# Patient Record
(demographics unavailable — no encounter records)

---

## 2025-05-15 NOTE — CONSULT LETTER
[Dear  ___] : Dear  [unfilled], [Consult Letter:] : I had the pleasure of evaluating your patient, [unfilled]. [Please see my note below.] : Please see my note below. [Consult Closing:] : Thank you very much for allowing me to participate in the care of this patient.  If you have any questions, please do not hesitate to contact me. [Sincerely,] : Sincerely, [FreeTextEntry3] : Blair Staton MD

## 2025-05-15 NOTE — ASSESSMENT
[FreeTextEntry1] : My impression is that of a gentleman with iron deficiency anemia and a history of Workman's struggle with weight control.  I have asked the patient to schedule both an upper endoscopy and a colonoscopy in the near future. I have reviewed the risks benefits and alternatives and provide the patient literature to read.  I have emphasized the need to have a good clean out including adequate fluid intake and avoiding seeds for one week prior to the procedure.  I have spent a great deal of time discussing the role of daily high-intensity exercise with the patient. We discussed behavior modification strategies to institute this habit.   I have discussed nutrition in great detail including consuming vegetable fibers on a daily basis and limiting simple carbohydrates 5 days per week.  We have also reviewed the benefits of soluble fiber supplementation, including (but not limited to), favorable effects on lipid profile, weight control and the salutary effects on colonic microbiota. We reviewed the effects of such daily habits on metabolism and the metabolic set point resulting in a healthy weight, decreased pain sensitivity (effecting the GI tract), bowel habits and other aspects of health.  Return to office after procedures

## 2025-05-15 NOTE — HISTORY OF PRESENT ILLNESS
[de-identified] : 4/2019 esophagitis and Workman's [FreeTextEntry1] : 4/2019 "negative" per pt [de-identified] :  IMPRESSION: Normal abdominal ultrasound.

## 2025-07-15 NOTE — ASSESSMENT
[FreeTextEntry1] : My impression is that of a gentleman with reported long standing iron deficiency anemia, s/p egd col with murry's with polyps on col  Plan:  EGD/COL with wats3d within the year.  Increase PPI to omep 40  I have reviewed the role of daily high-intensity exercise with the patient. We discussed behavior modification strategies to institute this habit.   I have discussed nutrition in great detail including consuming vegetable fibers on a daily basis and limiting simple carbohydrates 5 days per week.  We have also reviewed the benefits of soluble fiber supplementation, including (but not limited to), favorable effects on lipid profile, weight control and the salutary effects on colonic microbiota. We reviewed the effects of such daily habits on metabolism and the metabolic set point resulting in a healthy weight, decreased pain sensitivity (effecting the GI tract), bowel habits and other aspects of health.

## 2025-07-15 NOTE — PHYSICAL EXAM

## 2025-07-15 NOTE — HISTORY OF PRESENT ILLNESS
[de-identified] : 5/2025 Long segment murry's 4/2019 esophagitis and Murry's [FreeTextEntry1] : 4/2019 "negative" per pt [de-identified] :  IMPRESSION: Normal abdominal ultrasound.

## 2025-07-15 NOTE — HISTORY OF PRESENT ILLNESS
[de-identified] : 5/2025 Long segment murry's 4/2019 esophagitis and Murry's [FreeTextEntry1] : 4/2019 "negative" per pt [de-identified] :  IMPRESSION: Normal abdominal ultrasound.